# Patient Record
Sex: FEMALE | Race: WHITE | NOT HISPANIC OR LATINO | URBAN - METROPOLITAN AREA
[De-identification: names, ages, dates, MRNs, and addresses within clinical notes are randomized per-mention and may not be internally consistent; named-entity substitution may affect disease eponyms.]

---

## 2018-01-23 ENCOUNTER — HOSPITAL ENCOUNTER (OUTPATIENT)
Dept: INTERVENTIONAL RADIOLOGY/VASCULAR | Facility: CLINIC | Age: 58
Discharge: HOME | End: 2018-01-23
Attending: PSYCHIATRY & NEUROLOGY | Admitting: RADIOLOGY

## 2018-01-23 DIAGNOSIS — I67.1 DURAL ARTERIOVENOUS FISTULA: ICD-10-CM

## 2018-01-23 DIAGNOSIS — H93.A2 PULSATILE TINNITUS OF LEFT EAR: ICD-10-CM

## 2018-01-23 DIAGNOSIS — R20.2 PARESTHESIA: ICD-10-CM

## 2018-01-23 LAB
CREAT SERPL-MCNC: 0.69 MG/DL (ref 0.6–1.1)
GFR SERPL CREATININE-BSD FRML MDRD: >60 ML/MIN/1.73M2
HGB BLD-MCNC: 13.4 G/DL (ref 12–16)
PLATELET # BLD AUTO: 177 THOU/UL (ref 140–440)

## 2018-01-23 RX ORDER — ESTRADIOL 0.5 MG/1
0.5 TABLET ORAL DAILY
Status: SHIPPED | COMMUNITY
Start: 2018-01-23

## 2018-01-23 RX ORDER — OXYCODONE AND ACETAMINOPHEN 5; 325 MG/1; MG/1
1-2 TABLET ORAL EVERY 4 HOURS PRN
Status: SHIPPED | COMMUNITY
Start: 2018-01-23

## 2018-01-23 ASSESSMENT — MIFFLIN-ST. JEOR: SCORE: 1013.6

## 2018-01-29 ENCOUNTER — RECORDS - HEALTHEAST (OUTPATIENT)
Dept: LAB | Facility: CLINIC | Age: 58
End: 2018-01-29

## 2018-01-29 LAB
LYME TOTAL ANTIBODY - HISTORICAL: 0.09 INDEX VALUE
VIT B12 SERPL-MCNC: 848 PG/ML (ref 213–816)

## 2018-01-30 LAB
ANA SER QL: 0.4 U
SS-A/RO AUTOANTIBODIES - HISTORICAL: 7 EU
SS-B/LA AUTOANTIBODIES - HISTORICAL: 0 EU

## 2021-05-31 VITALS — BODY MASS INDEX: 17.07 KG/M2 | WEIGHT: 100 LBS | HEIGHT: 64 IN

## 2021-06-15 NOTE — PROCEDURES
[facility] Radiology Post Procedure    Procedure: Cerebral angiogram    Radiologist: Shane Camargo    Contrast: 100 mL Omnipaque 350  Fluoro time: 15.1 minutes  Fluoro dose: 1643 mGy    Medications: Versed 1 mg IV                        Fentanyl 50 mcg IV  Sedation Time: 45 minutes    EBL: Minimal  Complications: None evident    Preliminary findings: (see dictation for full detail)  - No evidence of dural arteriovenous fistula or other vascular lesion.    Assess/Plan:   Report to Dr Juan Wilkinson for 4 hours then discharge home    Shane Camargo

## 2021-06-15 NOTE — PROGRESS NOTES
"Interventional Neuroradiology-Pre Sedation assessment    57 year old female presents today for cerebral angiogram    HPI: 19 month history of pain and tinnitus.  Initially started with burning type pain of the Lt ear.  Tried antibiotics, steroids, tegretol, lyrica, neurontin without relief.  Saw ENT, neuro, and maxillofacial surgeon.  Had MRI, MRA, CTA without identified cause.  Now has burning pain in both ears L>R that radiates down her neck.  Dr Martinez has requested a cerebral angiogram to evaluate for AVF    History and physical reviewed with no changes    Allergies   Allergen Reactions     Kiwi Anaphylaxis     kiwi     Citrus And Derivatives Unknown     Sores in mouth,scratchy throat     Sulfa (Sulfonamide Antibiotics) Rash     Labs: Hgb 13.4, Plt 177, creat 0.69    Exam:   /80 (Patient Position: Sitting)  Pulse 77  Temp 97.5  F (36.4  C) (Oral)   Resp 16  Ht 5' 4\" (1.626 m)  Wt 100 lb (45.4 kg)  SpO2 98%  BMI 17.16 kg/m2  Gen: sitting up in bed, NAD but c/o pain in both ears and down both sides of her neck  Neuro: A/O x4, speech clear and fluent. RODRÍGUEZ  Cardiac: S1S2  Lungs: clear  Mallampati: 2    Impression: Okay to proceed with planned procedure using moderate IV sedation    Plan: Cerebral angiogram procedure its risks, benefits, and alternatives including but not limited to stroke, bleeding, contrast dye or medication reaction, renal failure, infection, vessel injury were discussed with patient and her sister.  Questions answered and patient gives consent to proceed.    Kelley Harrington, APRN, CNP      "